# Patient Record
Sex: FEMALE | Employment: STUDENT | URBAN - METROPOLITAN AREA
[De-identification: names, ages, dates, MRNs, and addresses within clinical notes are randomized per-mention and may not be internally consistent; named-entity substitution may affect disease eponyms.]

---

## 2018-03-20 LAB — PHQ9 SCORE: 11

## 2018-12-19 LAB — PHQ9 SCORE: 12

## 2019-01-03 ENCOUNTER — TRANSFERRED RECORDS (OUTPATIENT)
Dept: HEALTH INFORMATION MANAGEMENT | Facility: CLINIC | Age: 34
End: 2019-01-03

## 2019-01-03 LAB — PHQ9 SCORE: 26

## 2019-01-06 ENCOUNTER — HOSPITAL ENCOUNTER (EMERGENCY)
Facility: CLINIC | Age: 34
Discharge: HOME | End: 2019-01-06
Attending: FAMILY MEDICINE | Admitting: FAMILY MEDICINE
Payer: COMMERCIAL

## 2019-01-06 VITALS
RESPIRATION RATE: 17 BRPM | HEIGHT: 70 IN | SYSTOLIC BLOOD PRESSURE: 111 MMHG | WEIGHT: 130 LBS | OXYGEN SATURATION: 100 % | DIASTOLIC BLOOD PRESSURE: 76 MMHG | TEMPERATURE: 98.2 F | BODY MASS INDEX: 18.61 KG/M2 | HEART RATE: 71 BPM

## 2019-01-06 DIAGNOSIS — F32.2 CURRENT SEVERE EPISODE OF MAJOR DEPRESSIVE DISORDER WITHOUT PSYCHOTIC FEATURES, UNSPECIFIED WHETHER RECURRENT (H): ICD-10-CM

## 2019-01-06 DIAGNOSIS — R45.851 SUICIDAL IDEATION: ICD-10-CM

## 2019-01-06 PROCEDURE — 99284 EMERGENCY DEPT VISIT MOD MDM: CPT | Mod: Z6 | Performed by: FAMILY MEDICINE

## 2019-01-06 PROCEDURE — 99285 EMERGENCY DEPT VISIT HI MDM: CPT | Mod: 25 | Performed by: FAMILY MEDICINE

## 2019-01-06 PROCEDURE — 90791 PSYCH DIAGNOSTIC EVALUATION: CPT

## 2019-01-06 RX ORDER — GUANFACINE 1 MG/1
TABLET ORAL 2 TIMES DAILY
COMMUNITY
End: 2019-01-06

## 2019-01-06 SDOH — HEALTH STABILITY: MENTAL HEALTH: HOW OFTEN DO YOU HAVE A DRINK CONTAINING ALCOHOL?: NEVER

## 2019-01-06 ASSESSMENT — MIFFLIN-ST. JEOR: SCORE: 1374.93
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
## 2019-01-06 NOTE — ED AVS SNAPSHOT
[facility], [place], Emergency Department  [address]  [place] [address]  Phone:  [phone]  Fax:  [phone]                                    Michelle Colon   MRN: [id number]    Department:  [facility], Emergency Department   Date of Visit:  1/6/2019           After Visit Summary Signature Page    I have received my discharge instructions, and my questions have been answered. I have discussed any challenges I see with this plan with the nurse or doctor.    ..........................................................................................................................................  Patient/Patient Representative Signature      ..........................................................................................................................................  Patient Representative Print Name and Relationship to Patient    ..................................................               ................................................  Date                                   Time    ..........................................................................................................................................  Reviewed by Signature/Title    ...................................................              ..............................................  Date                                               Time          22EPIC Rev 08/18

## 2019-01-06 NOTE — ED NOTES
Safety search completed by staff. Compliant with search. Belongings placed in storage.UA not collected. No scrubs.

## 2019-01-06 NOTE — ED NOTES
I have performed an in person assessment of the patient. Based on this assessment the patient no longer requires a one on one attendant at this point in time.    Tal Keller MD  5:21 PM  January 6, 2019         Tal Keller MD  01/06/19 1020

## 2019-01-07 NOTE — DISCHARGE INSTRUCTIONS
Thank you for choosing [facility].     Please closely monitor for further symptoms. Return to the Emergency Department if you develop any new or worsening signs or symptoms.    If you received any opiate pain medications or sedatives during your visit, please do not drive for at least 8 hours.     Labs, cultures or final xray interpretations may still need to be reviewed.  We will call you if your plan of care needs to be changed.    Please follow up with plan of outpatient mental health therapy this week, and with your primary care physician or clinic.

## 2019-01-07 NOTE — PHARMACY-ADMISSION MEDICATION HISTORY
"Admission Medication History status for the 1/6/2019 admission is complete.  See EPIC admission navigator for Prior to Admission medications.    Medication history sources:  Patient      Medication history source reliability: Moderate    Medication adherence:  Good    Changes made to PTA medication list (reason)  Added: n/a  Deleted: guanfacine 1mg tab: Take by mouth 2 times daily (Unable to verify dose; please see explanation below)   Changed: n/a    Additional medication history information (including reliability of information, actions taken by pharmacist):   -Patient was a moderate historian; stated that she is currently taking guanfacine PO for headaches; pt stated last dose was yesterday. Last fill history per [facility] reports guanfacine 1mg tab, #30 tabs, 90 day supply. However, patient stated that she is taking \"1/6th\" of a 1mg tablet; unable to verify with pharmacy ([facility] [phone]) due to currently being closed. Pt did explicitly state that she was taking \"one over six\" of a tablet. Since it is unclear how the patient would manipulate the tablet to achieve this dose, the medication was deleted from the med list until further clarification.   -Pt has received 2018 influenza vaccine  -Allergies: Pt confirmed allergies to dairy (protein), eggs, and neosporin (states that last reaction entailed\"swelling\" around the wound, occurred about 3 years ago)     Time spent in this activity: 15 minutes    Medication history completed by: Erin Devi, PD3 Pharmacy Intern     The patient is currently on no regular medications.    "

## 2019-01-07 NOTE — ED NOTES
"ED to Behavioral Floor Handoff    SITUATION  Michelle Colon is a 33 year old female who speaks English and lives in a home with family members The patient arrived in the ED by ambulance from home with a complaint of Suicidal (pt brought in by [facility], pt stating that she is suicidal with a plan of jumping off the bridge)  .The patient's was diagnosed with   Final diagnoses:   Current severe episode of major depressive disorder without psychotic features, unspecified whether recurrent (H)   Suicidal ideation        Initial vitals were: BP: 115/72  Pulse: 69  Resp: 16  Height: 177.8 cm (5' 10\")  Weight: 59 kg (130 lb)  SpO2: 100 %   --------  Is the patient diabetic? No   If yes, last blood glucose? --     If yes, was this treated in the ED? --  --------  Is the patient inebriated (ETOH) No or Impaired on other substances? No  MSSA done? N/A  Last MSSA score: --    Were withdrawal symptoms treated? N/A  Does the patient have a seizure history? No. If yes, date of most recent seizure--  --------  Is the patient patient experiencing suicidal ideation? reports the following suicide factors: wants to jump off a bridge    Homicidal ideation? denies current or recent homicidal ideation or behaviors.    Self-injurious behavior/urges? denies current or recent self injurious behavior or ideation.  ------  Was pt aggressive in the ED No  Was a code called No  Is the pt now cooperative? Yes  -------  Meds given in ED: Medications - No data to display   Family present during ED course? No  Family currently present? No    BACKGROUND  Does the patient have a cognitive impairment or developmental disability? No  Allergies:   Allergies   Allergen Reactions     Dairy Aid [Lactase] Nausea and Vomiting     Eggs [Chicken-Derived Products (Egg)] Nausea and Vomiting     Neosporin [Neomycin-Polymyx-Gramicid] Swelling   .   Social demographics are   Social History     Socioeconomic History     Marital status: Single     Spouse name: " "None     Number of children: None     Years of education: None     Highest education level: None   Social Needs     Financial resource strain: None     Food insecurity - worry: None     Food insecurity - inability: None     Transportation needs - medical: None     Transportation needs - non-medical: None   Occupational History     None   Tobacco Use     Smoking status: Never Smoker     Smokeless tobacco: Never Used   Substance and Sexual Activity     Alcohol use: No     Frequency: Never     Drug use: No     Sexual activity: No   Other Topics Concern     None   Social History Narrative     None        ASSESSMENT  Labs results Labs Ordered and Resulted from Time of ED Arrival Up to the Time of Departure from the ED - No data to display   Imaging Studies: No results found for this or any previous visit (from the past 24 hour(s)).   Most recent vital signs /72   Pulse 69   Resp 16   Ht 1.778 m (5' 10\")   Wt 59 kg (130 lb)   LMP 01/02/2019 (Exact Date)   SpO2 100%   Breastfeeding? No   BMI 18.65 kg/m     Abnormal labs/tests/findings requiring intervention:---   Pain control: pt had none  Nausea control: pt had none    RECOMMENDATION  Are any infection precautions needed (MRSA, VRE, etc.)? No If yes, what infection? --  ---  Does the patient have mobility issues? independently. If yes, what device does the pt use? ---  ---  Is patient on 72 hour hold or commitment? No If on 72 hour hold, have hold and rights been given to patient? N/A  Are admitting orders written if after 10 p.m. ?N/A  Tasks needing to be completed:---     Sheridan Greenfield   [phone] [facility]   "

## 2019-01-07 NOTE — PROGRESS NOTES
To clarify, boyfriend moved all of his possessions out of the apartment and he will not respond to any telephone or tm contact.

## 2019-01-10 ENCOUNTER — HOSPITAL ENCOUNTER (OUTPATIENT)
Dept: BEHAVIORAL HEALTH | Facility: CLINIC | Age: 34
Discharge: HOME | End: 2019-01-10
Attending: PSYCHIATRY & NEUROLOGY | Admitting: PSYCHIATRY & NEUROLOGY
Payer: COMMERCIAL

## 2019-01-10 PROCEDURE — 90791 PSYCH DIAGNOSTIC EVALUATION: CPT

## 2019-01-10 RX ORDER — GUANFACINE 1 MG/1
1 TABLET ORAL 2 TIMES DAILY
COMMUNITY

## 2019-01-10 ASSESSMENT — PAIN SCALES - GENERAL: PAINLEVEL: NO PAIN (0)
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
## 2019-01-11 ENCOUNTER — HOSPITAL ENCOUNTER (OUTPATIENT)
Dept: BEHAVIORAL HEALTH | Facility: CLINIC | Age: 34
End: 2019-01-11
Attending: PSYCHIATRY & NEUROLOGY
Payer: COMMERCIAL

## 2019-01-11 PROBLEM — F33.2 MAJOR DEPRESSIVE DISORDER, RECURRENT EPISODE, SEVERE WITH ANXIOUS DISTRESS (H): Status: ACTIVE | Noted: 2019-01-11

## 2019-01-11 PROCEDURE — H0035 MH PARTIAL HOSP TX UNDER 24H: HCPCS

## 2019-01-11 ASSESSMENT — ANXIETY QUESTIONNAIRES
1. FEELING NERVOUS, ANXIOUS, OR ON EDGE: MORE THAN HALF THE DAYS
GAD7 TOTAL SCORE: 14
7. FEELING AFRAID AS IF SOMETHING AWFUL MIGHT HAPPEN: MORE THAN HALF THE DAYS
IF YOU CHECKED OFF ANY PROBLEMS ON THIS QUESTIONNAIRE, HOW DIFFICULT HAVE THESE PROBLEMS MADE IT FOR YOU TO DO YOUR WORK, TAKE CARE OF THINGS AT HOME, OR GET ALONG WITH OTHER PEOPLE: SOMEWHAT DIFFICULT
5. BEING SO RESTLESS THAT IT IS HARD TO SIT STILL: SEVERAL DAYS
6. BECOMING EASILY ANNOYED OR IRRITABLE: NEARLY EVERY DAY
3. WORRYING TOO MUCH ABOUT DIFFERENT THINGS: SEVERAL DAYS
2. NOT BEING ABLE TO STOP OR CONTROL WORRYING: MORE THAN HALF THE DAYS

## 2019-01-11 ASSESSMENT — PATIENT HEALTH QUESTIONNAIRE - PHQ9
SUM OF ALL RESPONSES TO PHQ QUESTIONS 1-9: 27
5. POOR APPETITE OR OVEREATING: NEARLY EVERY DAY

## 2019-01-11 NOTE — PROGRESS NOTES
"  Adult Mental Health Outpatient Group Therapy Progress Note     Michelle verbalizes the following treatment/discharge goals: \"I need a way to manage things so that when I feel down I don't go in the downward spiral\"       See Initial Treatment suggestions for the client during the time between Diagnostic Assessment and completion of the Master Individualized Treatment Plan.    Treatment Goals:     to be determined     Area of Treatment Focus:  Symptom Management, Personal Safety and Develop Socialization / Interpersonal Relationship Skills    Therapeutic Interventions/Treatment Strategies:  Support, Feedback, Safety Assessments, Structured Activity and Education    Response to Treatment Strategies:  Accepted Feedback, Gave Feedback, Listened, Focused on Goals, Attentive, Accepted Support and Alert    Name of Group:  Relax and review 200-250, 7 participants     Description and Outcome:  The group was provided with education on intentional physical relaxation and it importance in managing stress.  The group listened to a recorded progressive muscle relaxation and body scan. At the end of the exercise, group members were asked to focus on a \"aha\" moment from past few days and given a \"GLAD\" worksheet.  Client demonstrated understanding of session by participating in exercise.  Michelle shared she found progressive muscle relaxation helpful  No safety concerns.  Is this a Weekly Review of the Progress on the Treatment Plan?  No        "
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
## 2019-01-12 ASSESSMENT — ANXIETY QUESTIONNAIRES: GAD7 TOTAL SCORE: 14
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
## 2019-01-14 ENCOUNTER — HOSPITAL ENCOUNTER (OUTPATIENT)
Dept: BEHAVIORAL HEALTH | Facility: CLINIC | Age: 34
End: 2019-01-14
Attending: PSYCHIATRY & NEUROLOGY
Payer: COMMERCIAL

## 2019-01-14 PROCEDURE — H0035 MH PARTIAL HOSP TX UNDER 24H: HCPCS

## 2019-01-14 NOTE — PROGRESS NOTES
"Adult Mental Health Outpatient Group Therapy Progress Note     Client Initial Individualized Goals for Treatment:  \"I need a way to manage things so that when I feel down I don't go in the downward spiral\"         See Initial Treatment suggestions for the client during the time between Diagnostic Assessment and completion of the Master Individualized Treatment Plan.    Treatment Goals:  Follow Safety Plan   Ask for more information, support and/or assistance as needed.  Follow up with providers/community supports as needed:   Report increases or changes in symptoms to staff.  Report any personal safety concerns to staff.   Take medications as prescribed.  Report medication changes and/or side effects to staff.  Attend and participate in groups as scheduled or notify staff if unable to do so.  Report any use of substances to staff as this may impact your symptoms and/or personal safety.  Notify staff if you have any other issues that need to be addressed. This may include any current abuse / neglect / exploitation or other vulnerability.  Follow recommendations of your treatment team and discuss concerns if not in agreement.            Area of Treatment Focus:  Symptom Management    Therapeutic Interventions/Treatment Strategies:  Support, Feedback, Safety Assessments, Structured Activity and Education    Response to Treatment Strategies:  Accepted Feedback, Listened, Attentive, Accepted Support and Alert     Name of Group: Stress Management(1:00-2:00)     Number of Group Participants: 7    Description and Outcome:  Client presented with low mood and energy level  with fair focus and concentration. She was able to complete the Occupational Self Assessment(CONTRERAS).Things she would like to change include improving concentration and \"figure out how to make people listen\".  Client would benefit from additional opportunities to practice and implement content from this session  in every day life,relationships and mental " health recovery.    Is this a Weekly Review of the Progress on the Treatment Plan?  Yes.      Are Treatment Plan Goals being addressed?  Yes, continue treatment goals      Are Treatment Plan Strategies to Address Goals Effective?  Yes, continue treatment strategies      Are there any current contracts in place?  No

## 2019-01-14 NOTE — PROGRESS NOTES
[facility]   Dr. Baron's Psychiatric Progress Note  2019      Patient:  Michelle Colon   Medical Record Number:  [id number]  :  1985          Interim History:   The patient's care was discussed with the treatment team and chart notes were reviewed.  Weekend was ok.  Went to a yoga class.   It helps with anxiety.  It was very helpful.  Trying to focus more on worthwhile things.   Making some progress gradually.  She's wondering about getting into a DBT program.   She may go back to her therapist later this week and try to get into DBT.       Psychiatric ROS:  Mood:  down but stable  Sleep: initial and middle insomnia  Appetite:  Poor but eats as needs to so doesn't get dizzy  Eating:  minimal  Energy Level:LOW  Concentration/Memory Problems:  YES, it affects work  Suicidal Thoughts:No  Homicidal Thoughts:No  Psychotic Symptoms: No  Medication Side Effects:No  Medication Compliance:  not on any meds  Physical Complaints:negative         Medications:     Current Outpatient Medications   Medication Sig     guanFACINE (TENEX) 1 MG tablet Take 1 mg by mouth 2 times daily     No current facility-administered medications for this visit.              Allergies:     PAST PSYCH MEDS:  Prozac, Zoloft and Celexa    Allergies   Allergen Reactions     Dairy Aid [Lactase] Nausea and Vomiting     Eggs [Chicken-Derived Products (Egg)] Nausea and Vomiting     Neosporin [Neomycin-Polymyx-Gramicid] Swelling            Psychiatric Examination:   [facility] 2019 (Exact Date)   Weight is 0 lbs 0 oz  There is no height or weight on file to calculate BMI.    Appearance:  awake, alert and adequately groomed  Attitude:  cooperative  Eye Contact:  good  Mood:  depressed  Affect:  appropriate and in normal range  Speech:  clear, coherent  Psychomotor Behavior:  no evidence of tardive dyskinesia, dystonia, or tics  Throught Process:  logical  Associations:  no loose associations  Thought Content:   no evidence of suicidal ideation or homicidal ideation and no evidence of psychotic thought  Insight:  good  Judgement:  intact  Oriented to:  time, person, and place  Attention Span and Concentration:  intact  Recent and Remote Memory:  intact  Gait:Normal    Risk/Potential for Dangerousness:  Multiple Active Diagnoses:HIGH  Self Care:MODERATE  Suicide:MODERATE  Assault:LOW  Self Injurious Behaviors:LOW  Inappropriate Sexual Behavior:LOW         Labs:   No results found for this or any previous visit (from the past 24 hour(s)).     No results found for this or any previous visit (from the past 1008 hour(s)).      Impression:   This is a 33 year old female continues PHP.   She's looking into a DBT program.            DIagnoses:     Axis I:  Major depressive disorder, recurrent, with anxiety.   Axis II:  Borderline personality disorder.   Axis III:  See past medical history.            Plan:     Continue [facility] Partial Hospital Program.   Ms. Colon refuses to consider psychotropic medications.   Expect stabilization and completion of Partial Hospital Program.   Follow up with therapist, Elizabeth Hay at [facility] and completion of Partial Hospital Program.   Encourage Ms. Colon to consider DBT.  She says she would consider doing so.   Discussed TMS with Ms. Colon, she refuses to consider that.        Cirsthian Baron MD

## 2019-01-15 ENCOUNTER — HOSPITAL ENCOUNTER (OUTPATIENT)
Dept: BEHAVIORAL HEALTH | Facility: CLINIC | Age: 34
End: 2019-01-15
Attending: PSYCHIATRY & NEUROLOGY
Payer: COMMERCIAL

## 2019-01-15 PROCEDURE — H0035 MH PARTIAL HOSP TX UNDER 24H: HCPCS

## 2019-01-15 NOTE — PROGRESS NOTES
"Adult Mental Health Outpatient Group Therapy Progress Note       Client Initial Individualized Goals for Treatment: \"I need a way to manage things so that when I feel down I don't go in the downward spiral\"       Initial Treatment suggestions for the client during the time between Diagnostic Assessment and completion of the Individualized Treatment Plan:  Follow Safety Plan   Ask for more information, support and/or assistance as needed.  Follow up with providers/community supports as needed:   Report increases or changes in symptoms to staff.  Report any personal safety concerns to staff.   Take medications as prescribed.  Report medication changes and/or side effects to staff.  Attend and participate in groups as scheduled or notify staff if unable to do so.  Report any use of substances to staff as this may impact your symptoms and/or personal safety.  Notify staff if you have any other issues that need to be addressed. This may include any current abuse / neglect / exploitation or other vulnerability.  Follow recommendations of your treatment team and discuss concerns if not in agreement.      Area of Treatment Focus:  Symptom Management, Develop / Improve Independent Living Skills    Therapeutic Interventions/Treatment Strategies:  Support, Feedback, Structured Activity, Problem Solving and Education    Response to Treatment Strategies:  Accepted Feedback, Gave Feedback, Listened, Focused on Goals, Attentive, Accepted Support and Alert     Name of Group: Mindfulness 2-3 Group Participants:  8    Description and Outcome:  Discussed impact of mindfulness on depression and anxiety and relationship to non-judgmental awareness of emotions. Practiced mindfulness, discussed importance of regular daily practice.    Is this a Weekly Review of the Progress on the Treatment Plan?  No  "

## 2019-01-15 NOTE — PROGRESS NOTES
"Adult Mental Health Outpatient Group Therapy Progress Note       Client Initial Individualized Goals for Treatment: \"I need a way to manage things so that when I feel down I don't go in the downward spiral\"       Initial Treatment suggestions for the client during the time between Diagnostic Assessment and completion of the Individualized Treatment Plan:  Follow Safety Plan   Ask for more information, support and/or assistance as needed.  Follow up with providers/community supports as needed:   Report increases or changes in symptoms to staff.  Report any personal safety concerns to staff.   Take medications as prescribed.  Report medication changes and/or side effects to staff.  Attend and participate in groups as scheduled or notify staff if unable to do so.  Report any use of substances to staff as this may impact your symptoms and/or personal safety.  Notify staff if you have any other issues that need to be addressed. This may include any current abuse / neglect / exploitation or other vulnerability.  Follow recommendations of your treatment team and discuss concerns if not in agreement.      Area of Treatment Focus:  Symptom Management, Develop / Improve Independent Living Skills    Therapeutic Interventions/Treatment Strategies:  Support, Feedback, Structured Activity, Problem Solving and Education    Response to Treatment Strategies:  Accepted Feedback, Gave Feedback, Listened, Focused on Goals, Attentive, Accepted Support and Alert     Name of Group: Self-Talk Date/Time: 01/14 / 2019 [id number]; Group Participants:  7    Description and Outcome:  Client participated in an education group on values.  Discussed client s identified values and the affect her values are having on her life.  Information was presented on flexibility, adaptability, and change.  Client identified a value she wants to focus on increasing in her life and created actions steps for achieving her goal.  Discussed barriers to action steps " and problem solved for improved follow through.  Client benefitted from this group by problem solving and increasing self-awareness of own self-talk related to values.    Is this a Weekly Review of the Progress on the Treatment Plan?  No

## 2019-01-15 NOTE — PROGRESS NOTES
"Adult Mental Health Outpatient Group Therapy Progress Note       Client Initial Individualized Goals for Treatment: \"I need a way to manage things so that when I feel down I don't go in the downward spiral\"       Initial Treatment suggestions for the client during the time between Diagnostic Assessment and completion of the Individualized Treatment Plan:  Follow Safety Plan   Ask for more information, support and/or assistance as needed.  Follow up with providers/community supports as needed:   Report increases or changes in symptoms to staff.  Report any personal safety concerns to staff.   Take medications as prescribed.  Report medication changes and/or side effects to staff.  Attend and participate in groups as scheduled or notify staff if unable to do so.  Report any use of substances to staff as this may impact your symptoms and/or personal safety.  Notify staff if you have any other issues that need to be addressed. This may include any current abuse / neglect / exploitation or other vulnerability.  Follow recommendations of your treatment team and discuss concerns if not in agreement.      Area of Treatment Focus:  Symptom Management, Develop / Improve Independent Living Skills    Therapeutic Interventions/Treatment Strategies:  Support, Feedback, Structured Activity, Problem Solving and Education    Response to Treatment Strategies:  Accepted Feedback, Gave Feedback, Listened, Focused on Goals, Attentive, Accepted Support and Alert     Name of Group: Self-Talk Date/Time: 01/14 / 2019 [id number]; Group Participants:  7    Description and Outcome:  Client participated in daily mindfulness practice. She says she got out and participated in yoga this weekend, which she was happy about. She talked some about her relationship with boyfriend and how he just impulsively left one day and now is back and they are pretending that everything is the same, though she says she has no trust in him now. Talked about " communication being needed if trust is going to be re-built and also talked about identifying what she needs in terms of boundaries with him being around again.     Reviewed ideas about anxiety as a physical experience that most benefits from learning to physically relax the body. Discussed thinking as a possible trigger for anxiety, discussed ways of changing thinking in order to not trigger the anxiety so often.  Is this a Weekly Review of the Progress on the Treatment Plan?  No

## 2019-01-15 NOTE — PROGRESS NOTES
"  Adult Mental Health Outpatient Group Therapy Progress Note     Michelle verbalizes the following treatment/discharge goals: \"I need a way to manage things so that when I feel down I don't go in the downward spiral\"       See Initial Treatment suggestions for the client during the time between Diagnostic Assessment and completion of the Master Individualized Treatment Plan.    Treatment Goals:     to be determined     Area of Treatment Focus:  Symptom Management and Cultural / Spirituality    Therapeutic Interventions/Treatment Strategies:  Support, Feedback, Structured Activity, Clarification and Education    Response to Treatment Strategies:  Accepted Feedback, Gave Feedback, Listened, Focused on Goals, Attentive, Accepted Support and Alert    Name of Group:  Loss and grief, [id number], 9 participants      Description and Outcome:  Group was lead by chaplain Gil. Poems and discussion were used to facilitate the conversation of grief and loss. The group was encouraged to share and process their feelings of loss with a grief diagram. Themes discussed: recognizing grief as a feeling, accepting grief as our own, trusting grief to lead us to hope and remembering to show compassion to ourselves when we face grief. Michelle verbalized understanding of topic.  She shared that she was processing the information.    Is this a Weekly Review of the Progress on the Treatment Plan?  No        "

## 2019-01-15 NOTE — PROGRESS NOTES
"Adult Mental Health Outpatient Group Therapy Progress Note       Client Initial Individualized Goals for Treatment: \"I need a way to manage things so that when I feel down I don't go in the downward spiral\"       Initial Treatment suggestions for the client during the time between Diagnostic Assessment and completion of the Individualized Treatment Plan:  Follow Safety Plan   Ask for more information, support and/or assistance as needed.  Follow up with providers/community supports as needed:   Report increases or changes in symptoms to staff.  Report any personal safety concerns to staff.   Take medications as prescribed.  Report medication changes and/or side effects to staff.  Attend and participate in groups as scheduled or notify staff if unable to do so.  Report any use of substances to staff as this may impact your symptoms and/or personal safety.  Notify staff if you have any other issues that need to be addressed. This may include any current abuse / neglect / exploitation or other vulnerability.  Follow recommendations of your treatment team and discuss concerns if not in agreement.         Area of Treatment Focus:  Symptom Management and Develop / Improve Independent Living Skills    Therapeutic Interventions/Treatment Strategies:  Support, Feedback, Safety Assessment, Structured Activity, Problem Solving and Education    Response to Treatment Strategies:  Accepted Feedback, Gave Feedback, Listened, Focused on Goals, Attentive, Accepted Support and Alert    Name of Group:  Group therapy Date/Time: 01/15/2019 [id number], Group Participants: 8  Name of Group: Aftercare Planning Date/Time: 01/15/2019 / 1000 to 1050; Group Participants: 9    Description and Outcome:  Group therapy  Client was tearful in group and said she was unable to talk.  Dicussed managing current distress and client agreed to try the weighted blanket.  She reported later in group it was helpful.  She accepted support and listened to " others.  Client denied SI.   Aftercare planning  Client participated in an aftercare planning.  Client completed an exercise to assess needs, plan for skills to address the identified need, and create an action plan to follow through on piano lessons. Client worked with group members to get ideas and resources for assistance in identified areas.  Client demonstrated understanding of session content by creating a schedule for future use.    Is this a Weekly Review of the Progress on the Treatment Plan?  No

## 2019-01-16 ENCOUNTER — HOSPITAL ENCOUNTER (OUTPATIENT)
Dept: BEHAVIORAL HEALTH | Facility: CLINIC | Age: 34
End: 2019-01-16
Attending: PSYCHIATRY & NEUROLOGY
Payer: COMMERCIAL

## 2019-01-16 PROCEDURE — H0035 MH PARTIAL HOSP TX UNDER 24H: HCPCS

## 2019-01-16 NOTE — PROGRESS NOTES
"Adult Mental Health Partial Hospitalization Group Therapy Progress Note       Client Initial Individualized Goals for Treatment:  \"I need a way to manage things so that when I feel down I don't go in the downward spiral\"     Initial Treatment suggestions for the client during the time between Diagnostic Assessment and completion of the Individualized Treatment Plan:  Follow Safety Plan   Ask for more information, support and/or assistance as needed.  Follow up with providers/community supports as needed:   Report increases or changes in symptoms to staff.  Report any personal safety concerns to staff.   Take medications as prescribed.  Report medication changes and/or side effects to staff.  Attend and participate in groups as scheduled or notify staff if unable to do so.  Report any use of substances to staff as this may impact your symptoms and/or personal safety.  Notify staff if you have any other issues that need to be addressed. This may include any current abuse / neglect / exploitation or other vulnerability.  Follow recommendations of your treatment team and discuss concerns if not in agreement.    Area of Treatment Focus:  Symptom Management and Develop / Improve Independent Living Skills    Therapeutic Interventions/Treatment Strategies:  Support, Feedback, Structured Activity, Problem Solving, Clarification and Education    Response to Treatment Strategies:  Accepted Feedback, Listened, Focused on Goals, Attentive, Accepted Support and Alert    Name of Group:  OT life skills: goal integration  Date: 1/14/19  Time: 1:00-1:50  Group Participants: 7    Description and Outcome: Michelle attended and participated in a structured life skills psychoeducation group where intervention focuses on experiential learning, developing through practice, and generalizing taught skills. Through the use of supported social interactions, structured therapeutic and functional tasks in context, group members work towards " stabilizing and managing mental health symptoms for improved participation and function in valued roles, routines, relationships, and independent living.     To support progress towards treatment goals and psychiatric recovery, group members were led through a structured process to integrate new learning, skills, and emerging self-awareness into their daily and weekly life. The process includes:   1. Brief psychoeducation on evidence around the neuro-science of change with regards to setting small achievable goals.  2. Brief education on components of self-compassion in context of setting goals provided.  3. Brief psychoeducation and practice of 2 positive psychology skills: GLAD and 3 good things.   4. Write smart goals for the next week in 3 of the following areas: self-compassion, mindfulness, connections, wellness, lifestyle balance, discharge planning, and coping skills.   5. Integrate the goals into their life using a provided weekly planner. Encouraged to highlight the highlights as the week progresses.  6. Sharing intentions with the group for accountability, and to also receive support from peers as the week progresses  Michelle presents with labile affect today. She was able to focus and set the following 3 SMART goals for the week in support of his psychiatric recovery:   1) Mindfulness: 10 minutes of progressive muscle relaxation before bed 3x this week.  2) Lifestyle balance: Practice piano 1 hour per day  3) Lifestyle balance: read for 30 minutes 5x this week.     Validation and support provided. Michelle would benefit from additional opportunities to practice the content to be able to generalize it to her everyday life with increased intentionality, consistency, and efficacy in support of her psychiatric recovery.     Progress towards ITP goals: Michelle worked towards her initial goals today. Will continue to monitor and assess and develop OT life skills goals with her in subsequent sessions.     Is this a  Weekly Review of the Progress on the Treatment Plan?  Yes.      Are Treatment Plan Goals being addressed?  Yes, continue treatment goals      Are Treatment Plan Strategies to Address Goals Effective?  Yes, continue treatment strategies      Are there any current contracts in place?  No

## 2019-01-16 NOTE — PROGRESS NOTES
"  Adult Mental Health Outpatient Group Therapy Progress Note     Michelle verbalizes the following treatment/discharge goals: \"I need a way to manage things so that when I feel down I don't go in the downward spiral\"       See Initial Treatment suggestions for the client during the time between Diagnostic Assessment and completion of the Master Individualized Treatment Plan.    Treatment Goals:     to be determined     Area of Treatment Focus:  Symptom Management and Develop Socialization / Interpersonal Relationship Skills    Therapeutic Interventions/Treatment Strategies:  Support, Feedback, Structured Activity, Clarification and Education    Response to Treatment Strategies:  Accepted Feedback, Gave Feedback, Listened, Focused on Goals, Attentive, Accepted Support and Alert    Name of Group:  Mental health management [id number], 9 participants      Description and Outcome:  The group participated in a structured, psychoeducational discussion and activity comparing and contrasting self esteem and self compassion.  Self esteem was presented as a useful, but limiting construct, as if is prone to change with circumstances and relies on comparisons to others.  On the other hand, self compassion can be described as breathing the self kindly, especially during struggle.  Self compassion facilitates connection to others.  Handouts, including exercises to practise self compassion, were given. Michelle verbalized understanding of topic.    Is this a Weekly Review of the Progress on the Treatment Plan?  No        "

## 2019-01-16 NOTE — PROGRESS NOTES
"  Adult Mental Health Outpatient Group Therapy Progress Note     Michelle verbalizes the following treatment/discharge goals: \"I need a way to manage things so that when I feel down I don't go in the downward spiral\"       See Initial Treatment suggestions for the client during the time between Diagnostic Assessment and completion of the Master Individualized Treatment Plan.    Treatment Goals:     to be determined     Area of Treatment Focus:  Symptom Management and Develop Socialization / Interpersonal Relationship Skills    Therapeutic Interventions/Treatment Strategies:  Support, Feedback, Structured Activity, Clarification and Education    Response to Treatment Strategies:  Accepted Feedback, Gave Feedback, Listened, Focused on Goals, Attentive, Accepted Support and Alert    Name of Group:  Group psychotherapy 9-10; Interpersonal Relationships 10-11, 9 participants      Description and Outcome:  Michelle participated in daily mindfulness practice. Says \"its been a hard couple of days\". Her partner left Monday and now the apartment seems empty. Says she tried to talk to him about his moving all of his things and then coming back with no discussion about anything, just pretending everything is the same. Says he just left and has not been back. Says he \"does not know how to handle conflict\". Shared some about her history with him, this is not the first time there has been a break between them. He grew up with parents who never appeared to have any conflict and he says this is how he thinks relationships should be. She grew up with excessive conflict in her family, they are not a source of support for her. She notes that she thinks relationships should be somewhere in the middle, that conflict sometimes happens when working through things. Says she feels really abandoned by her boyfriend. She says she has not been a good partner either, she has been so depressed and knows she has not been able to be there for him as she " "believes she should. Says she was willing to work on things, even wanted to go to couples counseling. Validated her for being open to change, validated her feelings with these painful and difficult events. Also acknowledged that she feels that she has no resources for support currently as her boyfriend also told all of his family to not have any contact with her once he moved out. Discussed idea of possibly building other support while also acknowledging this does take time.     Discussed various communication styles (passive, aggressive, passive-aggressive, and assertive), reason people may use each and the effect of each style. Reviewed the \"assertive formula\", considered how these styles may be operating in individual situation. Identified opportunities for practicing assertiveness more often.    Is this a Weekly Review of the Progress on the Treatment Plan?  No        "

## 2019-01-17 ENCOUNTER — HOSPITAL ENCOUNTER (OUTPATIENT)
Dept: BEHAVIORAL HEALTH | Facility: CLINIC | Age: 34
End: 2019-01-17
Attending: PSYCHIATRY & NEUROLOGY
Payer: COMMERCIAL

## 2019-01-17 PROCEDURE — H0035 MH PARTIAL HOSP TX UNDER 24H: HCPCS

## 2019-01-17 ASSESSMENT — ANXIETY QUESTIONNAIRES
3. WORRYING TOO MUCH ABOUT DIFFERENT THINGS: SEVERAL DAYS
2. NOT BEING ABLE TO STOP OR CONTROL WORRYING: SEVERAL DAYS
6. BECOMING EASILY ANNOYED OR IRRITABLE: MORE THAN HALF THE DAYS
5. BEING SO RESTLESS THAT IT IS HARD TO SIT STILL: SEVERAL DAYS
IF YOU CHECKED OFF ANY PROBLEMS ON THIS QUESTIONNAIRE, HOW DIFFICULT HAVE THESE PROBLEMS MADE IT FOR YOU TO DO YOUR WORK, TAKE CARE OF THINGS AT HOME, OR GET ALONG WITH OTHER PEOPLE: SOMEWHAT DIFFICULT
7. FEELING AFRAID AS IF SOMETHING AWFUL MIGHT HAPPEN: MORE THAN HALF THE DAYS
GAD7 TOTAL SCORE: 13
1. FEELING NERVOUS, ANXIOUS, OR ON EDGE: NEARLY EVERY DAY

## 2019-01-17 ASSESSMENT — PATIENT HEALTH QUESTIONNAIRE - PHQ9
SUM OF ALL RESPONSES TO PHQ QUESTIONS 1-9: 18
5. POOR APPETITE OR OVEREATING: NEARLY EVERY DAY

## 2019-01-17 NOTE — PROGRESS NOTES
"Adult Mental Health Partial Hospitalization Group Therapy Progress Note       Client Initial Individualized Goals for Treatment:  \"I need a way to manage things so that when I feel down I don't go in the downward spiral\"     Initial Treatment suggestions for the client during the time between Diagnostic Assessment and completion of the Individualized Treatment Plan:  Follow Safety Plan   Ask for more information, support and/or assistance as needed.  Follow up with providers/community supports as needed:   Report increases or changes in symptoms to staff.  Report any personal safety concerns to staff.   Take medications as prescribed.  Report medication changes and/or side effects to staff.  Attend and participate in groups as scheduled or notify staff if unable to do so.  Report any use of substances to staff as this may impact your symptoms and/or personal safety.  Notify staff if you have any other issues that need to be addressed. This may include any current abuse / neglect / exploitation or other vulnerability.  Follow recommendations of your treatment team and discuss concerns if not in agreement.    Area of Treatment Focus:  Symptom Management and Develop / Improve Independent Living Skills    Therapeutic Interventions/Treatment Strategies:  Support, Feedback, Structured Activity, Problem Solving, Clarification and Education    Response to Treatment Strategies:  Accepted Feedback, Listened, Focused on Goals, Attentive, Accepted Support and Alert    Name of Group:  OT life skills  Date: 1/15/19  Time: 1:00-1:50  Group Participants: 8    Description and Outcome: Michelle attended and participated in a structured life skills psychoeducation group where intervention focuses on experiential learning, developing through practice, and generalizing taught skills. Through the use of supported social interactions, structured therapeutic and functional tasks in context, group members work towards stabilizing and " managing mental health symptoms for improved participation and function in valued roles, routines, relationships, and independent living.       Current Presenation: constricted, labile, anxious    Intervention: Provided client with written and verbal psychoeducation material focused on sensory modulation in support of self-regulation. Content included calming and alerting properties of internal and external sensory input.  Group members were guided through an exploration of sensory modalities for improved self-awareness and identifying sensory preference in support of distress tolerance and self-regulation skills.     Assessment: Michelle engages in the experiential process with intermittent focus and lability. She found the weighted blanket helpful. She was able to calm herself down enough to participate in the reflection portion of the experiential.  She would benefit from additional opportunities to practice the content to be able to generalize it to her everyday life with increased intentionality, consistency, and efficacy in support of her psychiatric recovery.     Progress towards ITP goals: Michelle worked towards her initial goals today. Will continue to monitor and assess and develop OT life skills goals with her in subsequent sessions.     Is this a Weekly Review of the Progress on the Treatment Plan?  No.

## 2019-01-17 NOTE — PROGRESS NOTES
"  Adult Mental Health Outpatient Group Therapy Progress Note     Michelle verbalizes the following treatment/discharge goals: \"I need a way to manage things so that when I feel down I don't go in the downward spiral\"       See Initial Treatment suggestions for the client during the time between Diagnostic Assessment and completion of the Master Individualized Treatment Plan.    Treatment Goals:     to be determined     Area of Treatment Focus:  Symptom Management and Develop Socialization / Interpersonal Relationship Skills    Therapeutic Interventions/Treatment Strategies:  Support, Feedback, Structured Activity, Clarification and Education    Response to Treatment Strategies:  Accepted Feedback, Gave Feedback, Listened, Focused on Goals, Attentive, Accepted Support and Alert    Name of Group:  Mental Health Management, [id number], 10 participantsSelf Awareness [id number] 9 participants    Description and Outcome:  Mental Health Management: The group participated in a structured, psychoeducational discussion and activity comparing and contrasting self esteem and self compassion.  Self esteem was presented as a useful, but limiting construct, as if is prone to change with circumstances and relies on comparisons to others.  On the other hand, self compassion can be described as breathing the self kindly, especially during struggle.  Self compassion facilitates connection to others.  Handouts, including exercises to practise self compassion, were given. Client demonstrated understanding of topic by completing worksheet, practising self compassion.  Michelle notices that she hears critical voice of father internally and identifies this as barrier to practising self compassion.  She voices understanding of ways to challenge these inner voices.     Self Awareness:The group was provided with a guided breathing and warmup structure with focus on increasing self awareness and on providing an embodied experience.  Discussion included " the importance of listening to body cues as a way of identifying emotion as well as accompanying needs and wants, and as a way of practising self compassion, authenticity, mindfulness, self expression,  and connection to other.   The group focused on practising self compassion , breathing and attending to self.  Client demonstrated understanding of topic by engaging in experiential.  Michelle said goodbye to group as this is last hour of program.    Is this a Weekly Review of the Progress on the Treatment Plan?  No

## 2019-01-17 NOTE — PROGRESS NOTES
"Adult Mental Health Outpatient Group Therapy Progress Note       Client Initial Individualized Goals for Treatment: \"I need a way to manage things so that when I feel down I don't go in the downward spiral\"       Initial Treatment suggestions for the client during the time between Diagnostic Assessment and completion of the Individualized Treatment Plan:  Follow Safety Plan   Ask for more information, support and/or assistance as needed.  Follow up with providers/community supports as needed:   Report increases or changes in symptoms to staff.  Report any personal safety concerns to staff.   Take medications as prescribed.  Report medication changes and/or side effects to staff.  Attend and participate in groups as scheduled or notify staff if unable to do so.  Report any use of substances to staff as this may impact your symptoms and/or personal safety.  Notify staff if you have any other issues that need to be addressed. This may include any current abuse / neglect / exploitation or other vulnerability.  Follow recommendations of your treatment team and discuss concerns if not in agreement.      Area of Treatment Focus:  Symptom Management and Develop / Improve Independent Living Skills     Therapeutic Interventions/Treatment Strategies:  Support, Feedback, Structured Activity, Problem Solving and Education     Response to Treatment Strategies:  Accepted Feedback, Gave Feedback, Listened, Focused on Goals, Attentive, Accepted Support and Alert  Name of Group:  Interpersonal Effectiveness Date/Time: 01/17/2019 / [id number]; Group Participants: 10    Description and Outcome:  Interpersonal Communication  Patient participated in a session on communication strategies.  Information on biology, environment, and previous experience affecting arousal was presented.  Those factors were  linked to communication and skills that were presented with the goal to help decrease arousal prior to communication.  Benefits of " effective communication were linked to goals in interactions and self-efficacy resulting from improved communication.  .Components of whole communication were presented and discussed among group members.  Examples were generated and problem solving barriers to effective communication were discussed among group members. Patient engaged with other group members in discussing examples.   Patient demonstrated understanding by participating in problem solving and working on examples with other group members.      Is this a Weekly Review of the Progress on the Treatment Plan?  No

## 2019-01-18 ASSESSMENT — ANXIETY QUESTIONNAIRES: GAD7 TOTAL SCORE: 13

## 2019-01-18 NOTE — PROGRESS NOTES
"Adult Mental Health Partial Hospitalization Group Therapy Progress Note       Client Initial Individualized Goals for Treatment:  \"I need a way to manage things so that when I feel down I don't go in the downward spiral\"    Treatment Goals:  1) Safety:   Client will notify staff when needing assistance to develop or implement a coping plan to manage suicidal or self injurious urges.  Client will use coping plan for safety, as needed.  2) Symptom Management:  Process and problem solve ways to manage her thoughts, emotions, and behaviors.   3) In OT life skills Michelle will:    Learn, practice, apply 2 skills/strategies that support lifestyle balance/structure/routine with an emphasis on self care and leisure/enjoyable activities for improved quality of life.     Learn, practice, generalize 2 sensory modulation or mindfulness based self regulation skills for improved concentration and distress tolerance.    As measured by self report and observation during groups daily.   4) Discharge Preparation:   Will develop an aftercare / transition plan by discharge date     Area of Treatment Focus:  Symptom Management and Develop / Improve Independent Living Skills    Therapeutic Interventions/Treatment Strategies:  Support, Feedback, Structured Activity, Problem Solving, Clarification and Education    Response to Treatment Strategies:  Accepted Feedback, Listened, Focused on Goals, Attentive, Accepted Support and Alert    Name of Group:  OT life skills  Date: 1/16/19  Time: 1:00-1:50  Group Participants: 9    Description and Outcome: Michelle attended and participated in a structured life skills psychoeducation group where intervention focuses on experiential learning, developing through practice, and generalizing taught skills. Through the use of supported social interactions, structured therapeutic and functional tasks in context, group members work towards stabilizing and managing mental health symptoms for improved participation " and function in valued roles, routines, relationships, and independent living.       Current Presenation: constricted, labile, anxious    Intervention: Provided with opportunity to engage in a creative self expressive semi structured activity to work on managing symptoms and monitoring her responses and self regulation.      Assessment: Michelle initiates and is independent in all aspects of her chosen activity. Brighter affect noted as she worked and conversed with her peers. More engaged and less constricted compared to past sessions. She would benefit from additional opportunities to practice the content to be able to generalize it to her everyday life with increased intentionality, consistency, and efficacy in support of her psychiatric recovery.   Progress towards ITP goals: She worked towards her ITP goal number 3 today.    Is this a Weekly Review of the Progress on the Treatment Plan?  No.

## 2019-01-21 NOTE — PROGRESS NOTES
"Adult Mental Health Partial Hospitalization Group Therapy Progress Note       Client Initial Individualized Goals for Treatment:  \"I need a way to manage things so that when I feel down I don't go in the downward spiral\"    Treatment Goals:  1) Safety:   Client will notify staff when needing assistance to develop or implement a coping plan to manage suicidal or self injurious urges.  Client will use coping plan for safety, as needed.  2) Symptom Management:  Process and problem solve ways to manage her thoughts, emotions, and behaviors.   3) In OT life skills Michelle will:    Learn, practice, apply 2 skills/strategies that support lifestyle balance/structure/routine with an emphasis on self care and leisure/enjoyable activities for improved quality of life.     Learn, practice, generalize 2 sensory modulation or mindfulness based self regulation skills for improved concentration and distress tolerance.    As measured by self report and observation during groups daily.   4) Discharge Preparation:   Will develop an aftercare / transition plan by discharge date     Area of Treatment Focus:  Symptom Management and Develop / Improve Independent Living Skills    Therapeutic Interventions/Treatment Strategies:  Support, Feedback, Structured Activity, Problem Solving, Clarification and Education    Response to Treatment Strategies:  Accepted Feedback, Listened, Focused on Goals, Attentive, Accepted Support and Alert    Name of Group:  OT life skills  Date: 1/17/19  Time: 1:00-1:50  Group Participants: 9    Description and Outcome: Michelle attended and participated in a structured life skills psychoeducation group where intervention focuses on experiential learning, developing through practice, and generalizing taught skills. Through the use of supported social interactions, structured therapeutic and functional tasks in context, group members work towards stabilizing and managing mental health symptoms for improved participation " and function in valued roles, routines, relationships, and independent living.       Current Presenation: constricted, labile, anxious    Intervention: Provided group with written and verbal psychoeducation focused on lifestyle balance and structure with an emphasis on leisure. Group members assessed their life balance and identified areas of unbalance. Psychological benefits of leisure were discussed and explored. Through a facilitated process, group members identified their leisure values and then reflected on their past, present, and future possibilities connecting their leisure activities with their values and identified small steps to including leisure in their routine in support of symptom management and mental wellbeing.     Assessment: Michelle participates with good focus and sharing skills. She is able to identify 2 leisure activities she is interested in pursuing and problem solved small ways she can initiate future engagement in them.  She would benefit from additional opportunities to practice the content to be able to generalize it to her everyday life with increased intentionality, consistency, and efficacy in support of her psychiatric recovery.     Progress towards ITP goals: She worked towards her ITP goal number 3 today.    Is this a Weekly Review of the Progress on the Treatment Plan?  No.